# Patient Record
Sex: FEMALE | Race: WHITE | Employment: FULL TIME | URBAN - METROPOLITAN AREA
[De-identification: names, ages, dates, MRNs, and addresses within clinical notes are randomized per-mention and may not be internally consistent; named-entity substitution may affect disease eponyms.]

---

## 2017-06-01 ENCOUNTER — HOSPITAL ENCOUNTER (OUTPATIENT)
Dept: PHYSICAL THERAPY | Age: 32
Discharge: HOME | End: 2017-06-01
Payer: COMMERCIAL

## 2017-06-01 PROCEDURE — 97110 THERAPEUTIC EXERCISES: CPT

## 2017-06-01 PROCEDURE — 97530 THERAPEUTIC ACTIVITIES: CPT

## 2017-06-01 PROCEDURE — 97162 PT EVAL MOD COMPLEX 30 MIN: CPT

## 2017-06-01 NOTE — PROGRESS NOTES
PT DAILY TREATMENT NOTE    Patient Name: Joanie Dobson  Date:2017  : 1985  [x]  Patient  Verified  Payor: BLUE CROSS / Plan: [facility] PPO / Product Type: PPO /    In time:5:00  Out time:6:08  Total Treatment Time (min): 68  Total Timed Codes (min): 68  Visit #: 1 of     Physical Therapy Evaluation - Lumbar Spine   Patient is a 28 y/o female presenting with chief c/o low back pain with right leg radiculopathy; LBP onset > 3 years ago with pregnancy and right leg pain intensifying within the past year. MRI from 2016 showed lower lumbar disc degeneration. Activity deficits/aggravating factors include standing up from flexed position, prolonged walking, intermittent giving out of right knee. Pt had R knee arthroscopic surgery in . Pt uses ice packs for relief. SIJ injections were done in 2017 without relief. General Health:  Red Flags Indicated?  [] Yes    [x] No  List:  Past History/Treatments: See above    Diagnostic Tests: [] Lab work [x] X-rays    [] CT [x] MRI     [] Other:  Results: See above    Functional Status  Prior level of function:  Present functional limitations:    OBJECTIVE  Posture: Decreased lumbar lordosis    Gait:  [] Normal     [x] Abnormal:  Antalgic on R hip    Active Movements: [] N/A   [] Too acute   [] Other:  ROM % AROM % PROM Comments:pain, area   Forward flexion  75     Extension  75     SB right  WFL     SB left  WFL     Rotation right  WFL     Rotation left  WFL       Repeated Movement Testing:    Neuro Screen [] WNL  Myotome/Dermatome/Reflexes:  Comments:    Dural Mobility:  SLR Sitting: [] R    [] L    [] +    [] -  @ (degrees):           Supine: [x] R    [] L    [x] +    [] -  @ (degrees):   Slump Test: [x] R    [] L    [x] +    [] -  @ (degrees):   Prone Knee Bend: [] R    [] L    [] +    [] -     Palpation  [] Min  [] Mod  [x] Severe    Location:BL PSIS, piriformis, L5-S1 facets  [] Min  [] Mod  [] Severe    Location:  [] Min [] Mod  [] Severe    Location:      Strength   L(0-5) R (0-5) N/T   Hip Flexion (L1,2) 4+ 4 []   Knee Extension (L3,4) 5 4 []   Ankle Dorsiflexion (L4) 5 5 []   Great Toe Extension (L5)   []   Ankle Plantarflexion (S1) 4- 4 []   Knee Flexion (S1,2)   []   Upper Abdominals   []   Lower Abdominals   []   Paraspinals 4- 4- []   Back Rotators   []   Gluteus Jose Guadalupe 4- 4- []   Other   []     Special Tests  Lumbar:  Lumb.  Compression: [] Pos  [] Neg               Lumbar Distraction:   [] Pos  [] Neg    Quadrant:  [] Pos  [] Neg   [] Flex  [] Ext    Sacroilliac:  Gaenslen's: [] R    [] L    [x] +    [] -     Compression: [] +    [] -     Gapping:  [] +    [] -     Thigh Thrust: [] R    [] L    [] +    [] -     Leg Length: [] +    [x] -   Position:           Hip: Chantel [place]:  [] R    [] L    [] +    [] -     Scour:  [] R    [] L    [] +    [] -     Piriformis: [] R    [] L    [] +    [] -          Deficits: Jan's: [] R    [] L    [] +    [] -     Mahendra: [x] R    [] L    [x] +    [] -     Hamstrings 90/90:    Gastrocsoleus (to neutral): Right: Left:       Other tests/comments:  OBJECTIVE  Modality (rationale):   []  E-Stim: type _ x _ min     []att   []unatt   []w/US   []w/ice   []w/heat  []  Traction: []cerv   []pelvic   _ lbs x _ min     []pro   []sup   []int   []const  []  Ultrasound: []cont   []pulse    _ W/cm2 x _  min   []1MHz   []3MHz  []  Iontophoresis: []take home patch w/ dexamethazone    []40mA   []80mA                               []_ mA min w/: []dexamethazone   []other:_  []  Ice pack _  min     [] Hot pack _  min     [] Paraffin _  min  []  Other:     Therapeutic Exercise: [x] see flow sheet     [] Other:_  Added/Changed Exercises:  [x]  Added:_See HEP  to improve (function): Lumbar/hip mobility, core stabilization  []  Changed:_ to improve (function):  (minutes: 15)    Therapeutic Activity: Patient education regarding sitting position, body/movement mechanics and assessing symptoms of directional preference with ADL's  (minutes: 10)    Manual Therapy:   (minutes: )    Other Objective/Functional Measures:   Pain Level (0-10 scale) post treatment: 8    ASSESSMENT  [x]  See Plan of Care  Patient is assigned a medium evaluation complexity based upon presence of arthritis/prior surgical history, FOTO score, and evolving/progressive symptom characteristics.     PLAN  See Plan of [address], [place] 6/1/2017  5:08 PM

## 2017-06-01 NOTE — PROGRESS NOTES
[address] [facility] AT [address], [address] [place] [address] Road  Phone: [phone]   [phone]  PLAN OF CARE / [address] [place] [address] PHYSICAL THERAPY SERVICES  Patient Name: Anuj Camara : 1985   Medical   Diagnosis: Lower back pain [M54.5] Treatment Diagnosis: M54.5   Onset Date:      Referral Source: Araceli Maria MD [facility]): 2017   Prior Hospitalization: See medical history Provider #: [id number]   Prior Level of Function: Independent w/ ADL's   Comorbidities: Arthritis, DDD   Medications: Verified on Patient Summary List   The Plan of Care and following information is based on the information from the initial evaluation.   ===========================================================================================  Assessment / key information:  Patient is a 30 y/o female presenting with chief c/o low back pain with right leg radiculopathy; LBP onset > 3 years ago with pregnancy and right leg pain intensifying within the past year. MRI from 2016 showed lower lumbar disc degeneration. Activity deficits/aggravating factors include standing up from flexed position, prolonged walking, intermittent giving out of right knee. Pt had R knee arthroscopic surgery in . Pt uses ice packs for relief. SIJ injections were done in 2017 without relief. Patient presents with antalgic gait on right hip/lower extremity. Lumbar AROM is limited 25% with flexion and extension (pain worse returning from flexed position). Pt demonstrates decreased strength throughout the right hip and quad muscles compared to left (4-/5 to 4/5). SLR and Slump tests + on the right leg. Pt is severely TTP to BL PSIS, L5-S1 facets and piriformis muscles. The patient was instructed in a home exercise program to address the above findings/deficits.  The patient should benefit from therapy services to progress toward functional goals and improve quality of life.  ===========================================================================================  History MEDIUM  Complexity : 1-2 comorbidities / personal factors will impact the outcome/ POC ; Examination HIGH Complexity : 4+ Standardized tests and measures addressing body structure, function, activity limitation and / or participation in recreation ; Presentation MEDIUM Complexity : Evolving with changing characteristics ; Decision Making MEDIUM Complexity : FOTO score of 26-74; Complexity MEDIUM  Problem List: pain affecting function, decrease ROM, decrease strength, impaired gait/ balance, decrease ADL/ functional abilitiies, decrease activity tolerance and decrease flexibility/ joint mobility   Treatment Plan may include any combination of the following: Therapeutic exercise, Therapeutic activities, Physical agent/modality, Manual therapy and Patient education  Patient / Family readiness to learn indicated by: asking questions, trying to perform skills and interest  Persons(s) to be included in education: patient (P)  Barriers to Learning/Limitations: None  Measures taken:    Patient Goal (s): Find exercises to relieve some pain   Patient self reported health status: good  Rehabilitation Potential: good   Short Term Goals: To be accomplished in  2-3  treatments:  Patient will be educated on sitting posture modification to reduce musculoskeletal strain with sitting ADL's  Independent/compliant with long term HEP for core stabilization and hip/lumbar flexibility   Long Term Goals:  To be accomplished in  6-8  weeks:  Improve FOTO outcome score by 12% to indicate a significant improvement in ADL function  Pt will report 50% reduction in frequency/severity of instances of right leg instability  Pt will report 50% functional improvement with forward bending ADL's  Frequency / Duration:   Patient to be seen  1-2  times per week for 6-8  weeks:  Patient / Caregiver education and instruction: activity modification and exercises  G-Codes (GP): NA  Therapist Signature: Radha Law PT, [facility] Date: 4/6/6392   Certification Period: NA Time: 6:25 PM   ===========================================================================================  I certify that the above Physical Therapy Services are being furnished while the patient is under my care. I agree with the treatment plan and certify that this therapy is necessary. Physician Signature:        Date:       Time:     Please sign and return to [facility] or you may fax the signed copy to [phone]. Thank you.

## 2017-07-11 NOTE — PROGRESS NOTES
[address] [facility] AT [address], [address] [place] [address]  Phone: [phone]   [phone]  DISCHARGE SUMMARY  Patient Name: Philip Worley : 1985   Treatment/Medical Diagnosis: Lower back pain [M54.5]   Referral Source: Maisha Manzo MD     Date of Initial Visit: 17 Attended Visits: 1 Missed Visits: 1     SUMMARY OF TREATMENT  Initial evaluation only with HEP instruction  CURRENT STATUS  The patient attended the initial evaluation only with diagnosis of lumbar pain with radiculopathy, and received instructions for HEP. The patient did not return to therapy due to reporting the need for an upcoming hernia surgery and is being discharged from therapy with no known change of status. RECOMMENDATIONS  Discharge due to undergoing unrelated surgery. If you have any questions/comments please contact us directly at [phone]. Thank you for allowing us to assist in the care of your patient.     Therapist Signature: Millicent Michel PT, [facility] Date: 17     Time: 9:21 AM

## 2020-11-09 PROBLEM — R10.2 PELVIC PAIN: Status: ACTIVE | Noted: 2020-11-09

## 2022-02-19 PROBLEM — A41.9 SEPSIS (HCC): Status: ACTIVE | Noted: 2022-02-19
